# Patient Record
Sex: MALE | Race: WHITE | NOT HISPANIC OR LATINO | Employment: FULL TIME | URBAN - METROPOLITAN AREA
[De-identification: names, ages, dates, MRNs, and addresses within clinical notes are randomized per-mention and may not be internally consistent; named-entity substitution may affect disease eponyms.]

---

## 2018-07-02 ENCOUNTER — TRANSCRIBE ORDERS (OUTPATIENT)
Dept: PHYSICAL THERAPY | Facility: CLINIC | Age: 43
End: 2018-07-02

## 2018-07-02 ENCOUNTER — EVALUATION (OUTPATIENT)
Dept: PHYSICAL THERAPY | Facility: CLINIC | Age: 43
End: 2018-07-02
Payer: COMMERCIAL

## 2018-07-02 DIAGNOSIS — Z96.641 HISTORY OF TOTAL RIGHT HIP ARTHROPLASTY: ICD-10-CM

## 2018-07-02 DIAGNOSIS — M25.551 RIGHT HIP PAIN: Primary | ICD-10-CM

## 2018-07-02 DIAGNOSIS — Z96.641 HISTORY OF TOTAL HIP REPLACEMENT, RIGHT: ICD-10-CM

## 2018-07-02 PROCEDURE — 97110 THERAPEUTIC EXERCISES: CPT | Performed by: PHYSICAL THERAPIST

## 2018-07-02 PROCEDURE — 97162 PT EVAL MOD COMPLEX 30 MIN: CPT | Performed by: PHYSICAL THERAPIST

## 2018-07-02 PROCEDURE — G8979 MOBILITY GOAL STATUS: HCPCS | Performed by: PHYSICAL THERAPIST

## 2018-07-02 PROCEDURE — G8978 MOBILITY CURRENT STATUS: HCPCS | Performed by: PHYSICAL THERAPIST

## 2018-07-02 PROCEDURE — 97140 MANUAL THERAPY 1/> REGIONS: CPT | Performed by: PHYSICAL THERAPIST

## 2018-07-02 RX ORDER — OXYCODONE AND ACETAMINOPHEN 10; 325 MG/1; MG/1
1 TABLET ORAL DAILY PRN
COMMUNITY

## 2018-07-02 RX ORDER — OLMESARTAN MEDOXOMIL 20 MG/1
20 TABLET ORAL DAILY
COMMUNITY

## 2018-07-02 RX ORDER — ROSUVASTATIN CALCIUM 40 MG/1
40 TABLET, COATED ORAL DAILY
COMMUNITY

## 2018-07-02 NOTE — LETTER
2018    Lulu Cloud MD  [address]    Patient: Marcheta Councilman  YOB: 1975   Date of Visit: 2018     Encounter Diagnosis     ICD-10-CM    1  Right hip pain M25 551    2  History of total hip replacement, right E69 241        Dear Dr Pena:    Please review the attached Plan of Care from [facility]  recent visit  Please verify that you agree therapy should continue by signing the attached document and sending it back to our office  If you have any questions or concerns, please don't hesitate to call  Sincerely,    Buddy Graham, PT      Referring Provider:      I certify that I have read the below Plan of Care and certify the need for these services furnished under this plan of treatment while under my care  Lulu Cloud MD  [address] [facility]  [address] [facility]: [phone]          PT Evaluation     Today's date: 2018  Patient name: Marcheta Councilman  : 1975  MRN: [id number]  Referring provider: Myles Olivarez MD  Dx:   Encounter Diagnosis     ICD-10-CM    1  Right hip pain M25 551    2  History of total hip replacement, right Z96 641                   Assessment    Assessment details: Patient is a 38y o  year old male who presents to physical therapy with physician diagnosis of Right hip pain  (primary encounter diagnosis)  History of total hip replacement, right  Patient would benefit from skilled physical therapy intervention to address his impairments and to maximize function  Thank you for your referral and please feel free to contact me at [phone]  Understanding of Dx/Px/POC: excellent   Prognosis: good    Goals  ST  Improve strength by 50% in 6 weeks  2  Improve ROM by 50% in 6 weeks    LTG  1  Discontinue using an AD in 12 weeks  2   Sleep without limitation in 12 weeks  3- return to work without limitation in 12 weeks    Plan  Patient would benefit from: PT eval  Frequency: 2x week  Duration in weeks: 12  Treatment plan discussed with: patient        Subjective Evaluation    History of Present Illness  Date of surgery: 2018  Mechanism of injury: Underwent right OMAR on 18 due to chromic pain and arthritis  He was discharged from inpatient after 1 day  He received home PT x 3 weeks  He is currently ambulating with an AD and is able to ascend descend stairs with handrail  He is having difficulty sleeping at night on his back  He prefers sleeping on his side but is unable  He has no MD restrictions at this time    Quality of life: good    Pain  Current pain ratin  At best pain ratin  At worst pain ratin  Quality: dull ache  Relieving factors: rest    Social Support  Steps to enter house: yes  Stairs in house: yes       Diagnostic Tests  X-ray: normal  Treatments  Previous treatment: physical therapy and home therapy  Patient Goals  Patient goals for therapy: decreased pain, independence with ADLs/IADLs, increased strength and increased motion          Objective     General Comments     Hip Comments   Gait: ambulates with a SPC  DLS and SLS not tested  PROM: flexion 100 degrees with tightness at end range, extension to netural, IR 10 degrees, ER 20 degrees  MMT - gross 3+/5  Severe incision, psoas and posterior compartment tightness  Hamstring, RF and psoas tightness            Precautions: none    Daily Treatment Diary       Manuals                          STM to psoas   right hip IR stretch  STM to posteriorcompartment on right 10'                                      Exercise Diary                           bike 10'            Standing glute squeeze 10x10"            Functional marching 10x10" Modalities

## 2018-07-02 NOTE — PROGRESS NOTES
PT Evaluation     Today's date: 2018  Patient name: Ana Chris  : 1975  MRN: [id number]  Referring provider: Devan Mejia MD  Dx:   Encounter Diagnosis     ICD-10-CM    1  Right hip pain M25 551    2  History of total hip replacement, right Z96 641                   Assessment    Assessment details: Patient is a 38y o  year old male who presents to physical therapy with physician diagnosis of Right hip pain  (primary encounter diagnosis)  History of total hip replacement, right  Patient would benefit from skilled physical therapy intervention to address his impairments and to maximize function  Thank you for your referral and please feel free to contact me at [phone]  Understanding of Dx/Px/POC: excellent   Prognosis: good    Goals  ST  Improve strength by 50% in 6 weeks  2  Improve ROM by 50% in 6 weeks    LTG  1  Discontinue using an AD in 12 weeks  2  Sleep without limitation in 12 weeks  3- return to work without limitation in 12 weeks    Plan  Patient would benefit from: PT eval  Frequency: 2x week  Duration in weeks: 12  Treatment plan discussed with: patient        Subjective Evaluation    History of Present Illness  Date of surgery: 2018  Mechanism of injury: Underwent right OMAR on 18 due to chromic pain and arthritis  He was discharged from inpatient after 1 day  He received home PT x 3 weeks  He is currently ambulating with an AD and is able to ascend descend stairs with handrail  He is having difficulty sleeping at night on his back  He prefers sleeping on his side but is unable  He has no MD restrictions at this time    Quality of life: good    Pain  Current pain ratin  At best pain ratin  At worst pain ratin  Quality: dull ache  Relieving factors: rest    Social Support  Steps to enter house: yes  Stairs in house: yes       Diagnostic Tests  X-ray: normal  Treatments  Previous treatment: physical therapy and home therapy  Patient Goals  Patient goals for therapy: decreased pain, independence with ADLs/IADLs, increased strength and increased motion          Objective     General Comments     Hip Comments   Gait: ambulates with a SPC  DLS and SLS not tested  PROM: flexion 100 degrees with tightness at end range, extension to netural, IR 10 degrees, ER 20 degrees  MMT - gross 3+/5  Severe incision, psoas and posterior compartment tightness  Hamstring, RF and psoas tightness            Precautions: none    Daily Treatment Diary       Manuals 7/2                         STM to psoas   right hip IR stretch  STM to posteriorcompartment on right 10'                                      Exercise Diary                           bike 10'            Standing glute squeeze 10x10"            Functional marching 10x10"                                                                                                                                                                                                                                                      Modalities

## 2018-07-05 ENCOUNTER — OFFICE VISIT (OUTPATIENT)
Dept: PHYSICAL THERAPY | Facility: CLINIC | Age: 43
End: 2018-07-05
Payer: COMMERCIAL

## 2018-07-05 DIAGNOSIS — M25.551 RIGHT HIP PAIN: Primary | ICD-10-CM

## 2018-07-05 DIAGNOSIS — Z96.641 HISTORY OF TOTAL HIP REPLACEMENT, RIGHT: ICD-10-CM

## 2018-07-05 PROCEDURE — 97140 MANUAL THERAPY 1/> REGIONS: CPT | Performed by: PHYSICAL THERAPIST

## 2018-07-05 PROCEDURE — 97110 THERAPEUTIC EXERCISES: CPT | Performed by: PHYSICAL THERAPIST

## 2018-07-05 NOTE — PROGRESS NOTES
Daily Note     Today's date: 2018  Patient name: Daxa Vergara  : 1975  MRN: [id number]  Referring provider: Jennyfer Linder MD  Dx:   Encounter Diagnosis     ICD-10-CM    1  Right hip pain M25 551    2  History of total hip replacement, right Z96 641                   Subjective: Reports glute soreness since last tx session       Objective: See treatment diary below      Assessment: Tolerated treatment well  Patient would benefit from continued PT      Plan: Continue per plan of care         Precautions: none    Daily Treatment Diary       Manuals                         STM to psoas   right   hip IR stretch  STM to posteriorcompartment on right  Active elongation of right hip flexors 10' 15                                     Exercise Diary                           bike 10' 10           Standing glute squeeze 10x10" 10x10"           Functional marching 10x10" 10x10"           Vigor gym 50%  Single leg 3x10           Functional marching at handrail  4x20"           Wt shift on step  10x                                                                                                                                                                                                              Modalities

## 2018-07-10 ENCOUNTER — OFFICE VISIT (OUTPATIENT)
Dept: PHYSICAL THERAPY | Facility: CLINIC | Age: 43
End: 2018-07-10
Payer: COMMERCIAL

## 2018-07-10 DIAGNOSIS — M25.551 RIGHT HIP PAIN: Primary | ICD-10-CM

## 2018-07-10 DIAGNOSIS — Z96.641 HISTORY OF TOTAL HIP REPLACEMENT, RIGHT: ICD-10-CM

## 2018-07-10 PROCEDURE — 97110 THERAPEUTIC EXERCISES: CPT | Performed by: PHYSICAL THERAPIST

## 2018-07-10 PROCEDURE — 97140 MANUAL THERAPY 1/> REGIONS: CPT | Performed by: PHYSICAL THERAPIST

## 2018-07-10 NOTE — PROGRESS NOTES
Daily Note     Today's date: 7/10/2018  Patient name: Lg Barrera  : 1975  MRN: [id number]  Referring provider: Elham Kim MD  Dx:   Encounter Diagnosis     ICD-10-CM    1  Right hip pain M25 551    2  History of total hip replacement, right Z96 641                   Subjective: Reports d/c the [facility] and has decreased limp with walking      Objective: See treatment diary below      Assessment: Tolerated treatment well  Patient would benefit from continued PT      Plan: Continue per plan of care         Precautions: none    Daily Treatment Diary       Manuals  7/10                       STM to psoas   right   hip IR stretch  STM to posteriorcompartment on right  Active elongation of right hip flexors 10' 15 15                                    Exercise Diary                           bike 10' 10 10          Standing glute squeeze 10x10" 10x10" 10x10"          Functional marching 10x10" 10x10" 10x10"          Vigor gym 50%  Single leg 3x10 Single leg 3x10          Functional marching at handrail  4x20" 4x20"          Wt shift on step  10x 10x          4 way stepping   10x          iso min squat on bosu   5x30"          Functional heel raises    10x5"                                                                                                                                                                      Modalities

## 2018-07-12 ENCOUNTER — OFFICE VISIT (OUTPATIENT)
Dept: PHYSICAL THERAPY | Facility: CLINIC | Age: 43
End: 2018-07-12
Payer: COMMERCIAL

## 2018-07-12 DIAGNOSIS — Z96.641 HISTORY OF TOTAL HIP REPLACEMENT, RIGHT: Primary | ICD-10-CM

## 2018-07-12 DIAGNOSIS — M25.551 RIGHT HIP PAIN: ICD-10-CM

## 2018-07-12 PROCEDURE — 97110 THERAPEUTIC EXERCISES: CPT

## 2018-07-12 PROCEDURE — 97140 MANUAL THERAPY 1/> REGIONS: CPT

## 2018-07-12 NOTE — PROGRESS NOTES
Daily Note     Today's date: 2018  Patient name: Nishi Hollins  : 1975  MRN: [id number]  Referring provider: Micah Perez MD  Dx:   Encounter Diagnosis     ICD-10-CM    1  History of total hip replacement, right Z96 641    2  Right hip pain M25 551      1:1 with PTA CR 1:45- 2:35  Subjective: Pain along incision with referred pain to quad tendon and occasionally patellar tendon; possibly due to deviated gait  Pain managed with Tylenol  Objective: See treatment diary below      Assessment: Tolerated treatment well  Patient would benefit from continued PT  Gastroc stretched prior to functional HR with improved tolerance  General fatigue post treatment but denied pain  Plan: Continue per plan of care         Precautions: none    Daily Treatment Diary       Manuals 7/2 7/5 7/10 7/12                      STM to psoas ,  right   hip IR stretch,  STM to posteriorcompartment on right  Active elongation of right hip flexors 10' 15 15 15 mins                                   Exercise Diary                           bike 10' 10 10 10 mins         Standing glute squeeze 10x10" 10x10" 10x10" 10"x10         Functional marching 10x10" 10x10" 10x10" 10"x10         Vigor gym 50%  Single leg 3x10 Single leg 3x10 Single  Leg  3x10         Functional marching at handrail  4x20" 4x20" 20" x 4 laps         Wt shift on step  10x 10x x10         4 way stepping   10x x10         iso min squat on bosu   5x30" 30"x5         Functional heel raises    10x5" 5"x10         gastroc stretch    30"x3                                                                                                                                                        Modalities

## 2018-07-17 ENCOUNTER — OFFICE VISIT (OUTPATIENT)
Dept: PHYSICAL THERAPY | Facility: CLINIC | Age: 43
End: 2018-07-17
Payer: COMMERCIAL

## 2018-07-17 DIAGNOSIS — M25.551 RIGHT HIP PAIN: Primary | ICD-10-CM

## 2018-07-17 DIAGNOSIS — Z96.641 HISTORY OF TOTAL HIP REPLACEMENT, RIGHT: ICD-10-CM

## 2018-07-17 PROCEDURE — 97110 THERAPEUTIC EXERCISES: CPT

## 2018-07-17 PROCEDURE — 97140 MANUAL THERAPY 1/> REGIONS: CPT

## 2018-07-17 NOTE — PROGRESS NOTES
Daily Note     Today's date: 2018  Patient name: Skylar Monk  : 1975  MRN: [id number]  Referring provider: Frannie Franklin MD  Dx:   Encounter Diagnosis     ICD-10-CM    1  Right hip pain M25 551    2  History of total hip replacement, right Z96 641        Start Time: 1340  Stop Time: 1440  Total time in clinic (min): 60 minutes    Subjective: Patient notes a 3/10 VAS this afternoon  Notes increasing overall strength  Objective: See treatment diary below      Assessment: Patient denied pain post session noting only muscular fatigue and selective tissue tension  Patient is a good rehabilitation candidate and would benefit from continued skilled PT intervention to address his remaining deficits  Plan: Continue per plan of care         Precautions: none    Daily Treatment Diary       Manuals 7/2 7/5 7/10 7/12 7/17                     STM to psoas ,  right   hip IR stretch,  STM to posteriorcompartment on right  Active elongation of right hip flexors 10' 15 15 15 mins 15'                                  Exercise Diary                           bike 10' 10 10 10 mins 10'        Standing glute squeeze 10x10" 10x10" 10x10" 10"x10 10x10''        Functional marching 10x10" 10x10" 10x10" 10"x10 10x10''        Vigor gym 50%  Single leg 3x10 Single leg 3x10 Single  Leg  3x10 3x10        Functional marching at handrail  4x20" 4x20" 20" x 4 laps 4 laps  20''        Wt shift on step  10x 10x x10 x10        4 way stepping   10x x10 x10 ea        iso min squat on bosu   5x30" 30"x5 5x30''        Functional heel raises    10x5" 5"x10 10x5''        gastroc stretch    30"x3 3x30''                                                                                                                                                       Modalities                  10' CP

## 2018-07-19 ENCOUNTER — OFFICE VISIT (OUTPATIENT)
Dept: PHYSICAL THERAPY | Facility: CLINIC | Age: 43
End: 2018-07-19
Payer: COMMERCIAL

## 2018-07-19 DIAGNOSIS — M25.551 RIGHT HIP PAIN: Primary | ICD-10-CM

## 2018-07-19 DIAGNOSIS — Z96.641 HISTORY OF TOTAL HIP REPLACEMENT, RIGHT: ICD-10-CM

## 2018-07-19 PROCEDURE — 97110 THERAPEUTIC EXERCISES: CPT

## 2018-07-19 NOTE — PROGRESS NOTES
Daily Note     Today's date: 2018  Patient name: Skylar Monk  : 1975  MRN: [id number]  Referring provider: Frannie Franklin MD  Dx:   Encounter Diagnosis     ICD-10-CM    1  Right hip pain M25 551    2  History of total hip replacement, right Z96 641        Start Time: 1343  Stop Time: 1425  Total time in clinic (min): 42 minutes    Subjective: Patient again notes a 3/10 VAS and reports that his endurance is improving with each passing day  Objective: See treatment diary below      Assessment: Patient noted no difficulties with today's treatment PRE only activities and reported only muscular fatigue post session  He declined modalities noting that he"felt good" and reported no pain  Patient would benefit from continued skilled PT intervention to address his remaining functional deficits  Plan: Continue per plan of care         Precautions: none    Daily Treatment Diary       Manuals 7/2 7/5 7/10 7/12 7/17 7/19                    STM to psoas ,  right   hip IR stretch,  STM to posteriorcompartment on right  Active elongation of right hip flexors 10' 15 15 15 mins 15' NP                                 Exercise Diary                           bike 10' 10 10 10 mins 10' 10'       Standing glute squeeze 10x10" 10x10" 10x10" 10"x10 10x10'' 10x10''       Functional marching 10x10" 10x10" 10x10" 10"x10 10x10'' 10x10''       Vigor gym 50%  Single leg 3x10 Single leg 3x10 Single  Leg  3x10 3x10 3x10       Functional marching at handrail  4x20" 4x20" 20" x 4 laps 4 laps  20'' 4 laps       Wt shift on step  10x 10x x10 x10 x10       4 way stepping   10x x10 x10 ea x10       iso min squat on bosu   5x30" 30"x5 5x30'' 5x30''       Functional heel raises    10x5" 5"x10 10x5'' 10x5''       gastroc stretch    30"x3 3x30'' 3x30''                                                                                                                                                      Modalities 10' CP declined

## 2018-07-24 ENCOUNTER — OFFICE VISIT (OUTPATIENT)
Dept: PHYSICAL THERAPY | Facility: CLINIC | Age: 43
End: 2018-07-24
Payer: COMMERCIAL

## 2018-07-24 DIAGNOSIS — Z96.641 HISTORY OF TOTAL HIP REPLACEMENT, RIGHT: ICD-10-CM

## 2018-07-24 DIAGNOSIS — M25.551 RIGHT HIP PAIN: Primary | ICD-10-CM

## 2018-07-24 PROCEDURE — 97110 THERAPEUTIC EXERCISES: CPT | Performed by: PHYSICAL THERAPIST

## 2018-07-24 NOTE — PROGRESS NOTES
Daily Note     Today's date: 2018  Patient name: Ludwin Dunbar  : 1975  MRN: [id number]  Referring provider: Erik Rivera MD  Dx:   Encounter Diagnosis     ICD-10-CM    1  Right hip pain M25 551    2  History of total hip replacement, right Z96 641                   Subjective: Patient is currently 7 weeks post-op  States he notices improved ambulation with less "sway" side to side  Pt reports he has had less pain in right hip overall  Objective: See treatment diary below  Assessment: Patient demonstrated good technique with TE today with no pain throughout session, fatigued with mini squats on BOSU ball and functional glute strengthening  Good tolerance to MT, declining modalities post-tx  Patient would benefit from continued skilled PT intervention to address his remaining functional deficits  Plan: Continue per plan of care         Precautions: none    Daily Treatment Diary       Manuals 7/2 7/5 7/10 7/12 7/17 7/19 7/24                   STM to psoas ,  right     hip IR stretch,    STM to posteriorcompartment on right    Active elongation of right hip flexors 10' 15 15 15 mins 15' NP 10'                                Exercise Diary                           bike 10' 10 10 10 mins 10' 10' 10'      Standing glute squeeze 10x10" 10x10" 10x10" 10"x10 10x10'' 10x10'' :10x10      Functional marching 10x10" 10x10" 10x10" 10"x10 10x10'' 10x10'' :10x10      Vigor gym 50%  Single leg 3x10 Single leg 3x10 Single  Leg  3x10 3x10 3x10 3x10      Exaggerated gait at handrail  4x20" 4x20" 20" x 4 laps 4 laps  20'' 4 laps 4 laps with heel raise      Wt shift on step  10x 10x x10 x10 x10 x10      4 way stepping   10x x10 x10 ea x10 x10       iso min squat on bosu   5x30" 30"x5 5x30'' 5x30'' :30x5      Functional heel raises    10x5" 5"x10 10x5'' 10x5'' :05x10      gastroc stretch    30"x3 3x30'' 3x30'' :30x3      Hip flexor EOT       2' on R Modalities                  10' CP declined declined

## 2018-07-26 ENCOUNTER — OFFICE VISIT (OUTPATIENT)
Dept: PHYSICAL THERAPY | Facility: CLINIC | Age: 43
End: 2018-07-26
Payer: COMMERCIAL

## 2018-07-26 DIAGNOSIS — Z96.641 HISTORY OF TOTAL HIP REPLACEMENT, RIGHT: ICD-10-CM

## 2018-07-26 DIAGNOSIS — M25.551 RIGHT HIP PAIN: Primary | ICD-10-CM

## 2018-07-26 PROCEDURE — 97110 THERAPEUTIC EXERCISES: CPT | Performed by: PHYSICAL THERAPIST

## 2018-07-26 NOTE — PROGRESS NOTES
Daily Note     Today's date: 2018  Patient name: Niraj Koroma  : 1975  MRN: [id number]  Referring provider: Loren Church MD  Dx:   Encounter Diagnosis     ICD-10-CM    1  Right hip pain M25 551    2  History of total hip replacement, right Z96 641                   Subjective: Patient is currently 7 weeks, 2 days post-op  States he notices improved ambulation with minimal pain in right hip  Feels his right hip is getting stronger with less "dip" in right hip when walking  Objective: See treatment diary below  Assessment: Patient demonstrated good technique with TE today with no pain throughout session  Continued fatigue with mini squats on BOSU ball and functional glute strengthening  Good tolerance to MT, tenderness in right posterior compartment with STM  Reduced symptoms post-tx  Plan: Continue per plan of care         Precautions: none    Daily Treatment Diary       Manuals 7/2 7/5 7/10 7/12 7/17 7/19 7/24 7/26                  STM to psoas ,  right - NP    hip IR stretch     STM to posteriorcompartment on right/glute    Active elongation of right hip flexors 10' 15 15 15 mins 15' NP 10' 10'                                Exercise Diary                           bike 10' 10 10 10 mins 10' 10' 10' 10'     Standing glute squeeze 10x10" 10x10" 10x10" 10"x10 10x10'' 10x10'' :10x10 :10x10     Functional marching 10x10" 10x10" 10x10" 10"x10 10x10'' 10x10'' :10x10 :10x10     Vigor gym 50%  Single leg 3x10 Single leg 3x10 Single  Leg  3x10 3x10 3x10 3x10 3x10     Exaggerated gait at handrail  4x20" 4x20" 20" x 4 laps 4 laps  20'' 4 laps 4 laps with heel raise 4 laps with heel raise     Wt shift on step  10x 10x x10 x10 x10 x10 x10     4 way stepping   10x x10 x10 ea x10 x10  x10     iso min squat on bosu   5x30" 30"x5 5x30'' 5x30'' :30x5 :30x5     Functional heel raises    10x5" 5"x10 10x5'' 10x5'' :05x10 :05x10     gastroc stretch    30"x3 3x30'' 3x30'' :30x3 :30x3     Hip flexor EOT       2' on R 2' on R                                                                                                                                        Modalities                  10' CP declined declined  declined

## 2018-07-31 ENCOUNTER — OFFICE VISIT (OUTPATIENT)
Dept: PHYSICAL THERAPY | Facility: CLINIC | Age: 43
End: 2018-07-31
Payer: COMMERCIAL

## 2018-07-31 DIAGNOSIS — Z96.641 HISTORY OF TOTAL HIP REPLACEMENT, RIGHT: ICD-10-CM

## 2018-07-31 DIAGNOSIS — M25.551 RIGHT HIP PAIN: Primary | ICD-10-CM

## 2018-07-31 PROCEDURE — 97140 MANUAL THERAPY 1/> REGIONS: CPT

## 2018-07-31 PROCEDURE — 97110 THERAPEUTIC EXERCISES: CPT

## 2018-07-31 NOTE — PROGRESS NOTES
Daily Note     Today's date: 2018  Patient name: Niraj Koroma  : 1975  MRN: [id number]  Referring provider: Loren Church MD  Dx:   Encounter Diagnosis     ICD-10-CM    1  Right hip pain M25 551    2  History of total hip replacement, right Z96 641                   Subjective:  Pt reports seeing MD today and was released to RTW  Pt c/o right lower thoracic/lumbar pain which he woke up with 2 nights ago  No new c/o's right hip  Objective: See treatment diary below  Assessment: Good tolerance to TE and manual therapy  Pt has good understanding of ex program and demonstrates proper technique  Continued fatigue with mini squats on BOSU ball and functional glute strengthening  Good response to manual therapy with decrease right thoracic/lumbar pain and tightness reported post treatment          Plan: D/C PT to [facility]P at the end of the week per MD       Precautions: none    Daily Treatment Diary       Manuals 7/2 7/5 7/10 7/12 7/17 7/19 7/24 7/26 7/31                 STM to psoas ,  right - NP    hip IR stretch     STM to posteriorcompartment on right/glute    Active elongation of right hip flexors 10' 15 15 15 mins 15' NP 10' 10'  10'  Also STM right thoracic/  lumbar paraspinals                               Exercise Diary                           bike 10' 10 10 10 mins 10' 10' 10' 10' 10'    Standing glute squeeze 10x10" 10x10" 10x10" 10"x10 10x10'' 10x10'' :10x10 :10x10 10"x 10    Functional marching 10x10" 10x10" 10x10" 10"x10 10x10'' 10x10'' :10x10 :10x10 10"x 10    Vigor gym 50%  Single leg 3x10 Single leg 3x10 Single  Leg  3x10 3x10 3x10 3x10 3x10 3x10    Exaggerated gait at handrail  4x20" 4x20" 20" x 4 laps 4 laps  20'' 4 laps 4 laps with heel raise 4 laps with heel raise 4 laps with heel raise    Wt shift on step  10x 10x x10 x10 x10 x10 x10 x10    4 way stepping   10x x10 x10 ea x10 x10  x10 x10    iso min squat on bosu   5x30" 30"x5 5x30'' 5x30'' :30x5 :30x5 30"x5 Functional heel raises    10x5" 5"x10 10x5'' 10x5'' :05x10 :05x10 5"x10    gastroc stretch    30"x3 3x30'' 3x30'' :30x3 :30x3 30"x3    Hip flexor EOT       2' on R 2' on R  2 min R                                                                                                                                      Modalities                  10' CP declined declined  declined

## 2018-08-02 ENCOUNTER — OFFICE VISIT (OUTPATIENT)
Dept: PHYSICAL THERAPY | Facility: CLINIC | Age: 43
End: 2018-08-02
Payer: COMMERCIAL

## 2018-08-02 DIAGNOSIS — Z96.641 HISTORY OF TOTAL HIP REPLACEMENT, RIGHT: ICD-10-CM

## 2018-08-02 DIAGNOSIS — M25.551 RIGHT HIP PAIN: Primary | ICD-10-CM

## 2018-08-02 PROCEDURE — 97110 THERAPEUTIC EXERCISES: CPT | Performed by: PHYSICAL THERAPIST

## 2018-08-02 PROCEDURE — G8979 MOBILITY GOAL STATUS: HCPCS | Performed by: PHYSICAL THERAPIST

## 2018-08-02 PROCEDURE — G8980 MOBILITY D/C STATUS: HCPCS | Performed by: PHYSICAL THERAPIST

## 2018-08-02 NOTE — PROGRESS NOTES
Daily Note / Discharge Summary    Today's date: 2018  Patient name: Ana Chris  : 1975  MRN: [id number]  Referring provider: Devan Mejia MD  Dx:   Encounter Diagnosis     ICD-10-CM    1  Right hip pain M25 551    2  History of total hip replacement, right Z96 641                   Subjective:  Pt has been seen for 10 visits since beginning PT on 18 and is 8 weeks, 2 days post-operative  He states an 85% improvement overall  FOTO score improved from 45 at IE to 62 this visit  Pt saw MD earlier this week and was released to return to work  Objective: See treatment diary below  Assessment: Good tolerance to TE and manual therapy  Pt has good understanding of ex program and demonstrates proper technique  No pain reported throughout program, able to perform efficient single leg squat  Plan: Plan of care discussed with patient and primary PT, agreed up on that patient will be discharged from skilled PT and transitioned to home exercise program for continued hip/lower extremity strengthening            Precautions: none    Daily Treatment Diary       Manuals 7/2 7/5 7/10 7/12 7/17 7/19 7/24 7/26 7/31 8/2                STM to psoas ,  right - NP    hip IR stretch     STM to posteriorcompartment on right/glute    Active elongation of right hip flexors 10' 15 15 15 mins 15' NP 10' 10'  10'  Also STM right thoracic/  lumbar paraspinals  held                             Exercise Diary                           bike 10' 10 10 10 mins 10' 10' 10' 10' 10' 10'   Standing glute squeeze 10x10" 10x10" 10x10" 10"x10 10x10'' 10x10'' :10x10 :10x10 10"x 10 :10x10   Functional marching 10x10" 10x10" 10x10" 10"x10 10x10'' 10x10'' :10x10 :10x10 10"x 10 :10x10   Vigor gym 50%  Single leg 3x10 Single leg 3x10 Single  Leg  3x10 3x10 3x10 3x10 3x10 3x10 3x10 SL   Exaggerated gait at handrail  4x20" 4x20" 20" x 4 laps 4 laps  20'' 4 laps 4 laps with heel raise 4 laps with heel raise 4 laps with heel raise 4 laps with heel raise   Wt shift on step  10x 10x x10 x10 x10 x10 x10 x10 x10   4 way stepping   10x x10 x10 ea x10 x10  x10 x10 x10   iso min squat on bosu   5x30" 30"x5 5x30'' 5x30'' :30x5 :30x5 30"x5 :30x5   Functional heel raises    10x5" 5"x10 10x5'' 10x5'' :05x10 :05x10 5"x10 :05x10   gastroc stretch    30"x3 3x30'' 3x30'' :30x3 :30x3 30"x3 :30x3   Hip flexor EOT       2' on R 2' on R  2 min R 2 min R                                                                                                                                     Modalities                  10' CP declined declined  declined

## 2021-04-06 DIAGNOSIS — Z23 ENCOUNTER FOR IMMUNIZATION: ICD-10-CM
